# Patient Record
Sex: MALE | Race: WHITE | Employment: OTHER | URBAN - METROPOLITAN AREA
[De-identification: names, ages, dates, MRNs, and addresses within clinical notes are randomized per-mention and may not be internally consistent; named-entity substitution may affect disease eponyms.]

---

## 2024-06-11 PROBLEM — N40.0 BENIGN PROSTATIC HYPERPLASIA WITHOUT LOWER URINARY TRACT SYMPTOMS: Status: ACTIVE | Noted: 2019-05-07

## 2024-06-11 PROBLEM — R10.32 LLQ PAIN: Status: ACTIVE | Noted: 2021-09-30

## 2024-06-11 PROBLEM — N52.9 ERECTILE DYSFUNCTION: Status: ACTIVE | Noted: 2019-05-07

## 2024-06-11 PROBLEM — R19.5 NARROWING OF STOOLS: Status: ACTIVE | Noted: 2018-08-06

## 2024-06-11 PROBLEM — N20.0 CALCULUS OF KIDNEY: Status: ACTIVE | Noted: 2024-03-06

## 2024-06-11 PROBLEM — H90.3 SENSORINEURAL HEARING LOSS, BILATERAL: Status: ACTIVE | Noted: 2022-02-14

## 2024-06-11 PROBLEM — K62.5 RECTAL BLEEDING: Status: ACTIVE | Noted: 2018-08-06

## 2024-06-11 PROBLEM — N28.1 RENAL CYST, ACQUIRED, LEFT: Status: ACTIVE | Noted: 2019-05-07

## 2024-06-11 PROBLEM — E87.1 HYPONATREMIA: Status: ACTIVE | Noted: 2023-10-19

## 2024-06-11 PROBLEM — R31.9 HEMATURIA: Status: ACTIVE | Noted: 2019-05-07

## 2024-06-11 PROBLEM — R19.4 CHANGE IN BOWEL HABITS: Status: ACTIVE | Noted: 2018-08-06

## 2024-06-12 ENCOUNTER — OFFICE VISIT (OUTPATIENT)
Dept: CARDIOLOGY | Facility: HOSPITAL | Age: 79
End: 2024-06-12
Payer: MEDICARE

## 2024-06-12 VITALS
SYSTOLIC BLOOD PRESSURE: 162 MMHG | BODY MASS INDEX: 28.11 KG/M2 | WEIGHT: 219 LBS | HEIGHT: 74 IN | DIASTOLIC BLOOD PRESSURE: 80 MMHG | HEART RATE: 68 BPM

## 2024-06-12 DIAGNOSIS — R01.1 HEART MURMUR: ICD-10-CM

## 2024-06-12 DIAGNOSIS — I10 HYPERTENSION, UNSPECIFIED TYPE: Primary | ICD-10-CM

## 2024-06-12 DIAGNOSIS — E78.5 HYPERLIPIDEMIA, UNSPECIFIED HYPERLIPIDEMIA TYPE: ICD-10-CM

## 2024-06-12 LAB
ATRIAL RATE: 68 BPM
P AXIS: 41 DEGREES
P OFFSET: 166 MS
P ONSET: 99 MS
PR INTERVAL: 216 MS
Q ONSET: 207 MS
QRS COUNT: 11 BEATS
QRS DURATION: 112 MS
QT INTERVAL: 422 MS
QTC CALCULATION(BAZETT): 448 MS
QTC FREDERICIA: 440 MS
R AXIS: -33 DEGREES
T AXIS: 59 DEGREES
T OFFSET: 418 MS
VENTRICULAR RATE: 68 BPM

## 2024-06-12 PROCEDURE — 99214 OFFICE O/P EST MOD 30 MIN: CPT | Performed by: NURSE PRACTITIONER

## 2024-06-12 PROCEDURE — 3077F SYST BP >= 140 MM HG: CPT | Performed by: NURSE PRACTITIONER

## 2024-06-12 PROCEDURE — 3078F DIAST BP <80 MM HG: CPT | Performed by: NURSE PRACTITIONER

## 2024-06-12 PROCEDURE — 1159F MED LIST DOCD IN RCRD: CPT | Performed by: NURSE PRACTITIONER

## 2024-06-12 PROCEDURE — 93005 ELECTROCARDIOGRAM TRACING: CPT | Performed by: NURSE PRACTITIONER

## 2024-06-12 PROCEDURE — G2211 COMPLEX E/M VISIT ADD ON: HCPCS | Performed by: NURSE PRACTITIONER

## 2024-06-12 PROCEDURE — 1160F RVW MEDS BY RX/DR IN RCRD: CPT | Performed by: NURSE PRACTITIONER

## 2024-06-12 PROCEDURE — 99204 OFFICE O/P NEW MOD 45 MIN: CPT | Performed by: NURSE PRACTITIONER

## 2024-06-12 RX ORDER — GEMFIBROZIL 600 MG/1
600 TABLET, FILM COATED ORAL DAILY
COMMUNITY
Start: 2023-12-14

## 2024-06-12 RX ORDER — AMLODIPINE BESYLATE 10 MG/1
10 TABLET ORAL
COMMUNITY
Start: 2023-10-21 | End: 2024-10-29

## 2024-06-12 RX ORDER — SPIRONOLACTONE 25 MG/1
25 TABLET ORAL DAILY
Qty: 90 TABLET | Refills: 3 | Status: SHIPPED | OUTPATIENT
Start: 2024-06-12 | End: 2025-06-12

## 2024-06-12 RX ORDER — VALSARTAN 160 MG/1
160 TABLET ORAL
COMMUNITY
Start: 2023-10-20

## 2024-06-12 RX ORDER — EPINEPHRINE 0.22MG
100 AEROSOL WITH ADAPTER (ML) INHALATION
COMMUNITY

## 2024-06-12 RX ORDER — HYDRALAZINE HYDROCHLORIDE 10 MG/1
10 TABLET, FILM COATED ORAL 3 TIMES DAILY
COMMUNITY
Start: 2023-12-13 | End: 2024-06-12 | Stop reason: ALTCHOICE

## 2024-06-12 RX ORDER — EZETIMIBE 10 MG/1
10 TABLET ORAL
COMMUNITY
Start: 2023-11-01

## 2024-06-12 RX ORDER — LANOLIN ALCOHOL/MO/W.PET/CERES
1000 CREAM (GRAM) TOPICAL DAILY
COMMUNITY

## 2024-06-12 RX ORDER — HYDROCHLOROTHIAZIDE 12.5 MG/1
12.5 TABLET ORAL
COMMUNITY
Start: 2023-11-01 | End: 2024-06-12 | Stop reason: ALTCHOICE

## 2024-06-12 RX ORDER — OMEGA-3/DHA/EPA/FISH OIL 100-400 MG
1000 CAPSULE ORAL 2 TIMES DAILY
COMMUNITY

## 2024-06-12 ASSESSMENT — ENCOUNTER SYMPTOMS
LOSS OF SENSATION IN FEET: 1
OCCASIONAL FEELINGS OF UNSTEADINESS: 0
DEPRESSION: 0

## 2024-06-12 ASSESSMENT — PATIENT HEALTH QUESTIONNAIRE - PHQ9
1. LITTLE INTEREST OR PLEASURE IN DOING THINGS: NOT AT ALL
2. FEELING DOWN, DEPRESSED OR HOPELESS: NOT AT ALL
SUM OF ALL RESPONSES TO PHQ9 QUESTIONS 1 AND 2: 0

## 2024-06-12 NOTE — PATIENT INSTRUCTIONS
Stop Hydralazine  Start Spironolactone 25 mg once a day  Continue all other cardiovascular medication  Check blood work in one week BMP and fasting lipid panel  Follow up in 3 months  Continue physical activity   CT calcium score- [phone] call to schedule

## 2024-06-12 NOTE — PROGRESS NOTES
Primary Care Physician: No primary care provider on file.  Date of Visit: 06/12/2024  3:00 PM EDT  Location of visit: [facility]     Chief Complaint:   Chief Complaint   Patient presents with    New Patient Visit        HPI / Summary:   Sandro Saxena is a 79 y.o. male presents for followup. Seen in collaboration with Dr. Patel. Patient was seen today to establish cardiovascular care. He has significant past medical history of Hypertension, Hyperlipidemia. He has been having issues with elevated blood pressure readings since he had Covid and was treated with Paxlovid, Remdesivir in March. He was hospitalized for 2 days while he had covid and also notes having hypokalemia and hyponatremia. He had been taking Hydrochlorothiazide 25 mg once a day. He has been walking 2 miles daily without chest pain or dyspnea. He has rare lightheadedness upon standing after bending over without near syncope or syncope. He has noticed ankle swelling. Denies chest pain, dyspnea, orthopnea, pnd, syncope, palpitations, or bleeding issues.     He has been monitoring his blood pressure at home. He brought a log of blood pressures to our office. His blood pressures are variable at home. Blood pressures range from 1 teens to 140s over 70s to 80s.     He had an echocardiogram and stress test done at the heart clinic 45 years ago.     He has allergy to Penicillin- reaction unknown.     He has significant past medical history of Hypertension, Hyperlipdiemia, Renal cyst, BPH, and Erectile dysfunction. Denies personal history of diabetes, cerebrovascular disease, venous thromboembolism, prior MI, or coronary disease.     He had a cholecystectomy 35 years ago.     He was a former smoker quit 45 years ago 1 pack per day for 15-20 years. Denies alcohol use. Denies illciit drug use. Retired from water treatment operator.     Denies significant family history of sudden cardiac death, cerebrovascular disease, venous thromboembolism.           "    Past Medical History:  History reviewed. No pertinent past medical history.     Past Surgical History:  History reviewed. No pertinent surgical history.       Social History:   reports that he quit smoking about 48 years ago. His smoking use included cigarettes. He has never been exposed to tobacco smoke. He has never used smokeless tobacco. He reports that he does not currently use alcohol. He reports that he does not use drugs.     Family History:  family history is not on file.      Allergies:  Allergies   Allergen Reactions    Penicillins Unknown       Outpatient Medications:  Current Outpatient Medications   Medication Instructions    amLODIPine (NORVASC) 10 mg, oral, Daily RT    coenzyme Q-10 100 mg, oral    coffee xt/phosphatidyl serine (NEURIVA ORIGINAL ORAL) oral, Daily    cyanocobalamin (VITAMIN B-12) 1,000 mcg, oral, Daily    ezetimibe (ZETIA) 10 mg, oral, Daily RT    FENUGREEK SEED EXTRACT ORAL oral, 2 times daily    gemfibrozil (LOPID) 600 mg, oral, Daily    hydrALAZINE (APRESOLINE) 10 mg, oral, 3 times daily    hydroCHLOROthiazide (MICROZIDE) 12.5 mg, oral, Daily RT    mv-min/FA/vit K/lutein/zeaxant (PRESERVISION AREDS 2 PLUS MV ORAL) 1 tablet, oral, 2 times daily    omega 3-dha-epa-fish oil 100-400-1,000 mg capsule 1,000 mg, oral, 2 times daily    saw/vit E/sod ash/lyc/beta/pyg (PROSTATE HEALTH ORAL) oral, 2 times daily, SUPER BETA PROSTATE    valsartan (DIOVAN) 160 mg, oral, TAKE 1 TABLET IN THE AM AND 1/2 TABLET IN PM       Physical Exam:  Vitals:    06/12/24 1441 06/12/24 1442   BP: (!) 171/97 (!) 170/95   BP Location: Left arm Right arm   Patient Position: Sitting Sitting   BP Cuff Size: Adult Adult   Pulse: 68    Weight: 99.3 kg (219 lb)    Height: 1.867 m (6' 1.5\")    Blood pressure Left upper arm: 166/60 and right upper arm: 162/80  Wt Readings from Last 5 Encounters:   06/12/24 99.3 kg (219 lb)     Body mass index is 28.5 kg/m².   GENERAL: alert, cooperative, pleasant, in no acute " distress  SKIN: warm and dry  NECK: Normal JVD, negative HJR  CARDIAC: Regular rate and rhythm with 2/6 early peaking systolic murmur, no rubs or gallops  CHEST: Normal respiratory efforts, lungs clear to auscultation bilaterally.  ABDOMEN: soft, nontender, nondistended  EXTREMITIES: no edema, +2 palpable RP and DP pulses bilaterally       Last Labs:      Lab Results   Component Value Date    HGBA1C 5.3 08/07/2023   BASIC METABOLIC PANEL  Order: [id number]  Component  Ref Range & Units 4 mo ago   Glucose  70 - 100 MG/DL 77   Comment:  NORMAL <100 mg/dL  PREDIABETES 101-126 mg/dL  DIABETES 126 mg/dL or higher   BUN  7 - 20 MG/DL 16   CREATININE SERUM  0.7 - 1.2 MG/DL 1.00   SODIUM  137 - 145 MMOL/L 143   POTASSIUM  3.5 - 5.1 MMOL/L 4.4   CHLORIDE  98 - 107 MMOL/L 105   Comment: Please note: Triglyceride levels of 600mg/dL or higher may positively bias chloride results by approximately 2.1 mmol   CARBON DIOXIDE (CO2)  22 - 30 MMOL/L 28   ANION GAP  8 - 16 MMOL/L 10   CALCIUM  8.4 - 10.2 MG/DL 9.6   ESTIMATED GFR, NON AFRICAN AMER  ml/min/1.73sq.m 77   ESTIMATED GFR, AFRICAN AMERICAN  ml/min/1.73sq.m 93   GFR COMMENT Average GFR for 70+ years old = 75.   Comment: Chronic Kidney disease, GFR = <60.  Kidney failure, GFR = <15.  The GFR estimate is not adjusted for extreme body surface area or acute process, nor has it been validated for pregnant women or ethnic groups other than  and .  Testing performed at [place], [place] [address]   Resulting Agency [address]     Specimen Collected: 02/01/24 13:37    Performed by: [address] Last Resulted: 02/01/24 16:10   Received From: [facility]  Result Received: 05/02/24 15:31      Contains abnormal data CBC, EDIF, PLATELET  Order: [id number]  Component  Ref Range & Units 7 mo ago   WBC (WHITE BLOOD COUNT)  3.6 -  11.0 10*3/uL 7.1   RBC  4.0 - 6.1 10*6/uL 4.36   HEMOGLOBIN (HGB)  14.0 - 18.0 G/DL 13.6 Low    HEMATOCRIT (HCT)  42.0 - 52.0 % 40.3 Low    MEAN CELL VOLUME  80.0 - 100.0 FL 92.5   Mean Cell HGB  26.0 - 35.0 PG 31.1   MEAN CELL HGB CONCENTRATION  27.0 - 37.0 G/DL 33.6   RBC DISTRIBUTION  11.5 - 14.5 % 13.6   PLATELET COUNT  130 - 400 10*3/uL 233   MEAN PLATELET VOLUME  7.4 - 11.0 FL 7.1 Low    DIFFERENTIAL TYPE  % AUTO DIFF   NEUTROPHILS  37.0 - 75.0 % 79.7 High    LYMPHOCYTE  20.0 - 55.0 % 11.5 Low    MONOCYTE %  0.0 - 10.0 % 6.6   EOSINOPHIL %  0.0 - 11.0 % 1.8   BASOPHIL %  0.0 - 2.0 % 0.4   Absolute Neutrophil Count  1.4 - 6.5 10*3/uL 5.7   LYMPHOCYTES, ABSOLUTE  1.2 - 3.4 10*3/uL 0.8 Low    MONOCYTES, ABSOLUTE  0.0 - 0.7 10*3/uL 0.5   ABSOLUTE EOSINOPHIL COUNT  0.0 - 0.7 10*3/uL 0.1   ABSOLUTE BASOPHIL COUNT  0.0 - 0.2 10*3/uL 0.0   Resulting Agency [address]     Specimen Collected: 10/24/23 11:54    Performed by: [address] Last Resulted: 10/24/23 13:05   Received From: [facility]  Result Received: 05/02/24 15:31      NT Pro BNP  Order: [id number]  Component  Ref Range & Units 7 mo ago   NT-Pro BNP  0 - 300 pg/mL 186   Resulting Agency  [facility]   Narrative  Performed by  LAB  Pride Study Cut-offs  Rule In:  < /= 50 Years                >450 pg/mL  51 Years - 75 Years       >900 pg/mL  76 Years - 99 Years         >1800 pg/mL  Rule Out:  All patients                 <300 pg/mL    Specimen Collected: 10/19/23 07:44    Performed by:  [facility] Last Resulted: 10/19/23 08:32   Received From: [facility]  Result Received: 05/02/24 15:31      TSH with Reflex Free T4  Order: [id number]  Component  Ref Range & Units 7 mo ago   TSH  0.27 - 4.20 mcIU/mL 0.39   Resulting Agency  [facility]     Specimen Collected: 10/19/23 07:44    Performed by:  LAB Last Resulted: 10/19/23 08:32   Received From: [facility]   Result Received: 05/02/24 15:31      LIPID PANEL W CALCULATED LDL  Order: [id number]  Component  Ref Range & Units 10 mo ago   CHOLESTEROL  120 - 200 MG/   TRIGLYCERIDE  0 - 150 MG/   HDL CHOLESTEROL  26 - 63 MG/DL 37   LDL CHOLESTEROL, CALCULATED  MG/   VLDL Cholesterol, Calculated  MG/DL 22   TCHOL/HDL RATIO, MANUAL ENTER  RATIO 4.51   Comment: RISK          TOTAL/HDL RATIO                  MEN     WOMEN  1/2 AVERAGE    3.43      3.27  AVERAGE        4.97      4.44  2X AVERAGE     9.55      7.05  3X AVERAGE    23.99     11.04   Resulting Agency [address]     Specimen Collected: 08/07/23 10:48    Performed by: [address] Last Resulted: 08/07/23 13:13   Received From: [facility]  Result Received: 05/02/24 15:31      HEMOGLOBIN A1C  Order: [id number]   Status: Final result           Component  Ref Range & Units 10 mo ago  (8/7/23) 2 yr ago  (6/13/22) 2 yr ago  (12/6/21) 3 yr ago  (6/11/21)   Hemoglobin A1C  0 - 6 % 5.3 5.5 R, CM 5.6 CM 5.7 CM   Comment:  NORMAL <5.7%  PREDIABETES 5.7-6.4%  DIABETES 6.5% OR HIGHER   Estimated Average Glucose  mg/dL 105 111 114  CM   Resulting [address]              Specimen Collected: 08/07/23 10:48    Performed By: [address] Last Resulted: 08/07/23 21:56   Received From: [facility]  Result Received: 05/02/24 15:31               Last Cardiology Tests:  ECG:  Obtained and reviewed EKG- normal sinus rhythm with first degree AV block, left axis deviation, minimal voltage criteria for LVH, possible prior anterior infarct     Echo:  Echo  Results:  No results found for this or any previous visit from the past 365 days.         Cath:      Stress Test:  Stress Results:  No results found for this or any previous visit from the past 365 days.         Cardiac Imaging:      Assessment/Plan   Problem List Items Addressed This Visit    None  Visit Diagnoses       Hypertension, unspecified type    -  Primary    Relevant Medications    spironolactone (Aldactone) 25 mg tablet    Other Relevant Orders    ECG 12 lead (Clinic Performed) (Completed)    Basic metabolic panel (Completed)    CT cardiac scoring wo IV contrast    Transthoracic Echo (TTE) Complete    Hyperlipidemia, unspecified hyperlipidemia type        Relevant Orders    CT cardiac scoring wo IV contrast    Heart murmur        Relevant Orders    Transthoracic Echo (TTE) Complete          In summary Mr. Sandro Saxena is a pleasant 79 year old white female with significant past medical history of Hypertension, Hyperlipidemia, Prior tobacco use. He was seen today to establish cardiovascular care. He has been concerned as blood pressures at home have been elevated since being treated for covid in March. His blood pressure today is elevated. He had hyponatremia and hypokalemia taking Hydrochlorothiazide 25 mg once a day previously. I did stop hydrochlorothiazide and hydralazine and start Spironolactone 25 mg once a day. I have ordered blood work to be done in one week as above. Given his history of hyperlipidemia I did order a CT calcium score to further risk stratify. I also ordered an echocardiogram for surveillance of a murmur. He will continue to monitor blood pressure at home and will call me blood pressures remain elevated. I have encouraged him to continue physical activity. He will continue current cardiovascular medications. He will follow up in 3 months.       Orders:  Orders Placed This Encounter   Procedures    ECG 12 lead (Clinic Performed)      Followup Appts:  No future appointments.         ____________________________________________________________  CHE Power-CNP  [facility]

## 2024-06-18 ENCOUNTER — LAB (OUTPATIENT)
Dept: LAB | Facility: LAB | Age: 79
End: 2024-06-18
Payer: MEDICARE

## 2024-06-18 DIAGNOSIS — I10 HYPERTENSION, UNSPECIFIED TYPE: ICD-10-CM

## 2024-06-18 LAB
ANION GAP SERPL CALC-SCNC: 12 MMOL/L (ref 10–20)
BUN SERPL-MCNC: 16 MG/DL (ref 6–23)
CALCIUM SERPL-MCNC: 9.6 MG/DL (ref 8.6–10.3)
CHLORIDE SERPL-SCNC: 103 MMOL/L (ref 98–107)
CO2 SERPL-SCNC: 27 MMOL/L (ref 21–32)
CREAT SERPL-MCNC: 1.17 MG/DL (ref 0.5–1.3)
EGFRCR SERPLBLD CKD-EPI 2021: 64 ML/MIN/1.73M*2
GLUCOSE SERPL-MCNC: 108 MG/DL (ref 74–99)
POTASSIUM SERPL-SCNC: 4.2 MMOL/L (ref 3.5–5.3)
SODIUM SERPL-SCNC: 138 MMOL/L (ref 136–145)

## 2024-06-18 PROCEDURE — 36415 COLL VENOUS BLD VENIPUNCTURE: CPT

## 2024-06-18 PROCEDURE — 80048 BASIC METABOLIC PNL TOTAL CA: CPT

## 2024-06-27 ENCOUNTER — HOSPITAL ENCOUNTER (OUTPATIENT)
Dept: RADIOLOGY | Facility: HOSPITAL | Age: 79
Discharge: HOME | End: 2024-06-27
Payer: MEDICARE

## 2024-06-27 DIAGNOSIS — E78.5 HYPERLIPIDEMIA, UNSPECIFIED HYPERLIPIDEMIA TYPE: ICD-10-CM

## 2024-06-27 DIAGNOSIS — I10 HYPERTENSION, UNSPECIFIED TYPE: ICD-10-CM

## 2024-06-27 PROCEDURE — 75571 CT HRT W/O DYE W/CA TEST: CPT

## 2024-07-09 DIAGNOSIS — I25.10 CORONARY ARTERY DISEASE INVOLVING NATIVE CORONARY ARTERY OF NATIVE HEART WITHOUT ANGINA PECTORIS: ICD-10-CM

## 2024-07-09 DIAGNOSIS — R93.1 ELEVATED CORONARY ARTERY CALCIUM SCORE: Primary | ICD-10-CM

## 2024-07-09 RX ORDER — NAPROXEN SODIUM 220 MG/1
81 TABLET, FILM COATED ORAL DAILY
Start: 2024-07-09 | End: 2025-07-09

## 2024-07-09 RX ORDER — ATORVASTATIN CALCIUM 80 MG/1
80 TABLET, FILM COATED ORAL DAILY
Qty: 90 TABLET | Refills: 3 | Status: SHIPPED | OUTPATIENT
Start: 2024-07-09 | End: 2025-07-09

## 2024-07-18 ENCOUNTER — LAB (OUTPATIENT)
Dept: LAB | Facility: LAB | Age: 79
End: 2024-07-18
Payer: MEDICARE

## 2024-07-18 DIAGNOSIS — I10 HYPERTENSION, UNSPECIFIED TYPE: ICD-10-CM

## 2024-07-18 LAB
ANION GAP SERPL CALC-SCNC: 12 MMOL/L (ref 10–20)
BUN SERPL-MCNC: 18 MG/DL (ref 6–23)
CALCIUM SERPL-MCNC: 9.5 MG/DL (ref 8.6–10.3)
CHLORIDE SERPL-SCNC: 101 MMOL/L (ref 98–107)
CO2 SERPL-SCNC: 29 MMOL/L (ref 21–32)
CREAT SERPL-MCNC: 1.13 MG/DL (ref 0.5–1.3)
EGFRCR SERPLBLD CKD-EPI 2021: 67 ML/MIN/1.73M*2
GLUCOSE SERPL-MCNC: 121 MG/DL (ref 74–99)
POTASSIUM SERPL-SCNC: 4.6 MMOL/L (ref 3.5–5.3)
SODIUM SERPL-SCNC: 137 MMOL/L (ref 136–145)

## 2024-07-18 PROCEDURE — 36415 COLL VENOUS BLD VENIPUNCTURE: CPT

## 2024-07-18 PROCEDURE — 80048 BASIC METABOLIC PNL TOTAL CA: CPT

## 2024-07-30 ENCOUNTER — HOSPITAL ENCOUNTER (OUTPATIENT)
Dept: RADIOLOGY | Facility: HOSPITAL | Age: 79
Discharge: HOME | End: 2024-07-30
Payer: MEDICARE

## 2024-07-30 ENCOUNTER — HOSPITAL ENCOUNTER (OUTPATIENT)
Dept: CARDIOLOGY | Facility: HOSPITAL | Age: 79
Discharge: HOME | End: 2024-07-30
Payer: MEDICARE

## 2024-07-30 DIAGNOSIS — R93.1 ELEVATED CORONARY ARTERY CALCIUM SCORE: ICD-10-CM

## 2024-07-30 DIAGNOSIS — I25.10 CORONARY ARTERY DISEASE INVOLVING NATIVE CORONARY ARTERY OF NATIVE HEART WITHOUT ANGINA PECTORIS: ICD-10-CM

## 2024-07-30 PROCEDURE — A9502 TC99M TETROFOSMIN: HCPCS | Performed by: NURSE PRACTITIONER

## 2024-07-30 PROCEDURE — 3430000001 HC RX 343 DIAGNOSTIC RADIOPHARMACEUTICALS: Performed by: NURSE PRACTITIONER

## 2024-07-30 PROCEDURE — 93018 CV STRESS TEST I&R ONLY: CPT | Performed by: STUDENT IN AN ORGANIZED HEALTH CARE EDUCATION/TRAINING PROGRAM

## 2024-07-30 PROCEDURE — 93016 CV STRESS TEST SUPVJ ONLY: CPT | Performed by: STUDENT IN AN ORGANIZED HEALTH CARE EDUCATION/TRAINING PROGRAM

## 2024-07-30 PROCEDURE — 78452 HT MUSCLE IMAGE SPECT MULT: CPT

## 2024-07-30 PROCEDURE — 93017 CV STRESS TEST TRACING ONLY: CPT

## 2024-07-30 PROCEDURE — 78452 HT MUSCLE IMAGE SPECT MULT: CPT | Performed by: STUDENT IN AN ORGANIZED HEALTH CARE EDUCATION/TRAINING PROGRAM

## 2024-09-05 ENCOUNTER — TELEPHONE (OUTPATIENT)
Dept: CARDIOLOGY | Facility: HOSPITAL | Age: 79
End: 2024-09-05

## 2024-09-05 ENCOUNTER — LAB (OUTPATIENT)
Dept: LAB | Facility: LAB | Age: 79
End: 2024-09-05
Payer: MEDICARE

## 2024-09-05 DIAGNOSIS — R93.1 ELEVATED CORONARY ARTERY CALCIUM SCORE: ICD-10-CM

## 2024-09-05 DIAGNOSIS — I25.10 CORONARY ARTERY DISEASE INVOLVING NATIVE CORONARY ARTERY OF NATIVE HEART WITHOUT ANGINA PECTORIS: ICD-10-CM

## 2024-09-05 LAB
CHOLEST SERPL-MCNC: 92 MG/DL (ref 0–199)
CHOLESTEROL/HDL RATIO: 2.5
HDLC SERPL-MCNC: 37 MG/DL
LDLC SERPL CALC-MCNC: 31 MG/DL
NON HDL CHOLESTEROL: 55 MG/DL (ref 0–149)
TRIGL SERPL-MCNC: 121 MG/DL (ref 0–149)
VLDL: 24 MG/DL (ref 0–40)

## 2024-09-05 PROCEDURE — 36415 COLL VENOUS BLD VENIPUNCTURE: CPT

## 2024-09-05 PROCEDURE — 80061 LIPID PANEL: CPT

## 2024-09-05 NOTE — TELEPHONE ENCOUNTER
Patient called saying since starting the atorvastatin he has been having awful leg cramps. It has gotten so bad the last couple days he did not take it today.

## 2024-09-18 ENCOUNTER — OFFICE VISIT (OUTPATIENT)
Dept: CARDIOLOGY | Facility: HOSPITAL | Age: 79
End: 2024-09-18
Payer: MEDICARE

## 2024-09-18 ENCOUNTER — HOSPITAL ENCOUNTER (OUTPATIENT)
Dept: CARDIOLOGY | Facility: HOSPITAL | Age: 79
Discharge: HOME | End: 2024-09-18
Payer: MEDICARE

## 2024-09-18 VITALS
HEIGHT: 74 IN | BODY MASS INDEX: 27.98 KG/M2 | DIASTOLIC BLOOD PRESSURE: 85 MMHG | HEART RATE: 68 BPM | OXYGEN SATURATION: 97 % | SYSTOLIC BLOOD PRESSURE: 169 MMHG | WEIGHT: 218 LBS

## 2024-09-18 DIAGNOSIS — I10 HYPERTENSION, UNSPECIFIED TYPE: ICD-10-CM

## 2024-09-18 DIAGNOSIS — I25.10 CORONARY ARTERY DISEASE INVOLVING NATIVE CORONARY ARTERY OF NATIVE HEART WITHOUT ANGINA PECTORIS: ICD-10-CM

## 2024-09-18 DIAGNOSIS — R01.1 HEART MURMUR: ICD-10-CM

## 2024-09-18 DIAGNOSIS — I10 HYPERTENSION, UNSPECIFIED TYPE: Primary | ICD-10-CM

## 2024-09-18 DIAGNOSIS — E78.5 HYPERLIPIDEMIA, UNSPECIFIED HYPERLIPIDEMIA TYPE: ICD-10-CM

## 2024-09-18 PROCEDURE — 1159F MED LIST DOCD IN RCRD: CPT | Performed by: NURSE PRACTITIONER

## 2024-09-18 PROCEDURE — G2211 COMPLEX E/M VISIT ADD ON: HCPCS | Performed by: NURSE PRACTITIONER

## 2024-09-18 PROCEDURE — 3079F DIAST BP 80-89 MM HG: CPT | Performed by: NURSE PRACTITIONER

## 2024-09-18 PROCEDURE — 3077F SYST BP >= 140 MM HG: CPT | Performed by: NURSE PRACTITIONER

## 2024-09-18 PROCEDURE — 93306 TTE W/DOPPLER COMPLETE: CPT | Performed by: INTERNAL MEDICINE

## 2024-09-18 PROCEDURE — 93306 TTE W/DOPPLER COMPLETE: CPT

## 2024-09-18 PROCEDURE — 99214 OFFICE O/P EST MOD 30 MIN: CPT | Performed by: NURSE PRACTITIONER

## 2024-09-18 PROCEDURE — 99214 OFFICE O/P EST MOD 30 MIN: CPT | Mod: 25 | Performed by: NURSE PRACTITIONER

## 2024-09-18 RX ORDER — ROSUVASTATIN CALCIUM 20 MG/1
20 TABLET, COATED ORAL DAILY
Qty: 90 TABLET | Refills: 3 | Status: SHIPPED | OUTPATIENT
Start: 2024-09-18 | End: 2025-09-18

## 2024-09-18 RX ORDER — AMLODIPINE BESYLATE 5 MG/1
5 TABLET ORAL
Qty: 90 TABLET | Refills: 3 | Status: SHIPPED | OUTPATIENT
Start: 2024-09-18 | End: 2025-09-18

## 2024-09-18 RX ORDER — VALSARTAN 160 MG/1
160 TABLET ORAL 2 TIMES DAILY
Qty: 180 TABLET | Refills: 3 | Status: SHIPPED | OUTPATIENT
Start: 2024-09-18 | End: 2025-09-18

## 2024-09-18 NOTE — PATIENT INSTRUCTIONS
Decrease Amlodipine to 5 mg once a day  Stay off Atorvastatin  Start Rosuvastatin 20 mg once a day  Increase Valsartan to 160 mg twice a day  Continue all other meds  Check blood work one week BMP  Follow up in March with Dr. Patel  Continue to monitor blood pressure.   If you get muscle aches/cramping and/or low blood pressures message me or call me

## 2024-09-18 NOTE — PROGRESS NOTES
Primary Care Physician: Nicolás Salter II, MD  Date of Visit: 09/18/2024  2:00 PM EDT  Location of visit: [facility]     Chief Complaint:   Chief Complaint   Patient presents with    Follow-up        HPI / Summary:   Sandro Saxena is a 79 y.o. male presents for followup. Seen in collaboration with Dr. Patel. He has been walking 2 miles every other day without chest pain or dyspnea. He stopped taking Atorvastatin 80 mg daily on 9/11 due to myalgias. His myalgias had since resolved. He has noted increased lower extremity edema and sensation of feeling cold all the time since taking Amlodipine 10 mg daily. Denies chest pain, dyspnea, orthopnea, pnd, lightheadedness, syncope, palpitations, or bleeding issues.     He has been monitoring his blood pressure at home. His blood pressures have been 1 teens to 130s over 70s.     No past medical history on file.     Past Surgical History:  No past surgical history on file.       Social History:   reports that he quit smoking about 48 years ago. His smoking use included cigarettes. He has never been exposed to tobacco smoke. He has never used smokeless tobacco. He reports that he does not currently use alcohol. He reports that he does not use drugs.     Family History:  family history is not on file.      Allergies:  Allergies   Allergen Reactions    Penicillins Unknown       Outpatient Medications:  Current Outpatient Medications   Medication Instructions    amLODIPine (NORVASC) 10 mg, oral, Daily RT    aspirin 81 mg, oral, Daily    atorvastatin (LIPITOR) 80 mg, oral, Daily    coenzyme Q-10 100 mg, oral    coffee xt/phosphatidyl serine (NEURIVA ORIGINAL ORAL) oral, Daily    cyanocobalamin (VITAMIN B-12) 1,000 mcg, oral, Daily    ezetimibe (ZETIA) 10 mg, oral, Daily RT    FENUGREEK SEED EXTRACT ORAL oral, 2 times daily    mv-min/FA/vit K/lutein/zeaxant (PRESERVISION AREDS 2 PLUS MV ORAL) 1 tablet, oral, 2 times daily    omega 3-dha-epa-fish oil 100-400-1,000 mg capsule  "1,000 mg, oral, 2 times daily    saw/vit E/sod ash/lyc/beta/pyg (PROSTATE HEALTH ORAL) oral, 2 times daily, SUPER BETA PROSTATE    spironolactone (ALDACTONE) 25 mg, oral, Daily    valsartan (DIOVAN) 160 mg, oral, TAKE 1 TABLET IN THE AM AND 1/2 TABLET IN PM       Physical Exam:  Vitals:    09/18/24 1348   BP: 169/85   BP Location: Left arm   Pulse: 68   SpO2: 97%   Weight: 98.9 kg (218 lb)   Height: 1.88 m (6' 2\")   Manual BP /80. Patient's blood pressure machine 164/97  Wt Readings from Last 5 Encounters:   09/18/24 98.9 kg (218 lb)   06/12/24 99.3 kg (219 lb)     Body mass index is 27.99 kg/m².     GENERAL: alert, cooperative, pleasant, in no acute distress  SKIN: warm and dry  NECK: Normal JVD, negative HJR  CARDIAC: Regular rate and rhythm with 2/6 early peaking systolic murmur RUSB no rubs or gallops  CHEST: Normal respiratory efforts, lungs clear to auscultation bilaterally.  ABDOMEN: soft, nontender, nondistended  EXTREMITIES: +1 bilateral lower extremity edema, +2 palpable RP bilaterally       Last Labs:    Lab Results   Component Value Date    HGBA1C 5.3 08/07/2023   BASIC METABOLIC PANEL  Order: [id number]  Component  Ref Range & Units 4 mo ago   Glucose  70 - 100 MG/DL 77   Comment:  NORMAL <100 mg/dL  PREDIABETES 101-126 mg/dL  DIABETES 126 mg/dL or higher   BUN  7 - 20 MG/DL 16   CREATININE SERUM  0.7 - 1.2 MG/DL 1.00   SODIUM  137 - 145 MMOL/L 143   POTASSIUM  3.5 - 5.1 MMOL/L 4.4   CHLORIDE  98 - 107 MMOL/L 105   Comment: Please note: Triglyceride levels of 600mg/dL or higher may positively bias chloride results by approximately 2.1 mmol   CARBON DIOXIDE (CO2)  22 - 30 MMOL/L 28   ANION GAP  8 - 16 MMOL/L 10   CALCIUM  8.4 - 10.2 MG/DL 9.6   ESTIMATED GFR, NON AFRICAN AMER  ml/min/1.73sq.m 77   ESTIMATED GFR, AFRICAN AMERICAN  ml/min/1.73sq.m 93   GFR COMMENT Average GFR for 70+ years old = 75.   Comment: Chronic Kidney disease, GFR = <60.  Kidney failure, GFR = <15.  The GFR estimate is not " adjusted for extreme body surface area or acute process, nor has it been validated for pregnant women or ethnic groups other than  and .  Testing performed at [place], [place] [address]   Resulting Agency [address]     Specimen Collected: 02/01/24 13:37    Performed by: [address] Last Resulted: 02/01/24 16:10   Received From: [facility]  Result Received: 05/02/24 15:31      Contains abnormal data CBC, EDIF, PLATELET  Order: [id number]  Component  Ref Range & Units 7 mo ago   WBC (WHITE BLOOD COUNT)  3.6 - 11.0 10*3/uL 7.1   RBC  4.0 - 6.1 10*6/uL 4.36   HEMOGLOBIN (HGB)  14.0 - 18.0 G/DL 13.6 Low    HEMATOCRIT (HCT)  42.0 - 52.0 % 40.3 Low    MEAN CELL VOLUME  80.0 - 100.0 FL 92.5   Mean Cell HGB  26.0 - 35.0 PG 31.1   MEAN CELL HGB CONCENTRATION  27.0 - 37.0 G/DL 33.6   RBC DISTRIBUTION  11.5 - 14.5 % 13.6   PLATELET COUNT  130 - 400 10*3/uL 233   MEAN PLATELET VOLUME  7.4 - 11.0 FL 7.1 Low    DIFFERENTIAL TYPE  % AUTO DIFF   NEUTROPHILS  37.0 - 75.0 % 79.7 High    LYMPHOCYTE  20.0 - 55.0 % 11.5 Low    MONOCYTE %  0.0 - 10.0 % 6.6   EOSINOPHIL %  0.0 - 11.0 % 1.8   BASOPHIL %  0.0 - 2.0 % 0.4   Absolute Neutrophil Count  1.4 - 6.5 10*3/uL 5.7   LYMPHOCYTES, ABSOLUTE  1.2 - 3.4 10*3/uL 0.8 Low    MONOCYTES, ABSOLUTE  0.0 - 0.7 10*3/uL 0.5   ABSOLUTE EOSINOPHIL COUNT  0.0 - 0.7 10*3/uL 0.1   ABSOLUTE BASOPHIL COUNT  0.0 - 0.2 10*3/uL 0.0   Resulting Agency [address]     Specimen Collected: 10/24/23 11:54    Performed by: [address] Last Resulted: 10/24/23 13:05   Received From: [facility]  Result Received: 05/02/24 15:31      NT Pro BNP  Order: [id number]  Component  Ref Range & Units 7 mo ago   NT-Pro BNP  0 - 300 pg/mL 186    Resulting Agency  LAB   Narrative  Performed by  LAB  Pride Study Cut-offs  Rule In:  < /= 50 Years                >450 pg/mL  51 Years - 75 Years       >900 pg/mL  76 Years - 99 Years         >1800 pg/mL  Rule Out:  All patients                 <300 pg/mL    Specimen Collected: 10/19/23 07:44    Performed by:  LAB Last Resulted: 10/19/23 08:32   Received From: [facility]  Result Received: 05/02/24 15:31      TSH with Reflex Free T4  Order: [id number]  Component  Ref Range & Units 7 mo ago   TSH  0.27 - 4.20 mcIU/mL 0.39   Resulting [facility]     Specimen Collected: 10/19/23 07:44    Performed by:  LAB Last Resulted: 10/19/23 08:32   Received From: [facility]  Result Received: 05/02/24 15:31      LIPID PANEL W CALCULATED LDL  Order: [id number]  Component  Ref Range & Units 10 mo ago   CHOLESTEROL  120 - 200 MG/   TRIGLYCERIDE  0 - 150 MG/   HDL CHOLESTEROL  26 - 63 MG/DL 37   LDL CHOLESTEROL, CALCULATED  MG/   VLDL Cholesterol, Calculated  MG/DL 22   TCHOL/HDL RATIO, MANUAL ENTER  RATIO 4.51   Comment: RISK          TOTAL/HDL RATIO                  MEN     WOMEN  1/2 AVERAGE    3.43      3.27  AVERAGE        4.97      4.44  2X AVERAGE     9.55      7.05  3X AVERAGE    23.99     11.04   Resulting [address]     Specimen Collected: 08/07/23 10:48    Performed by: [address] Last Resulted: 08/07/23 13:13   Received From: [facility]  Result Received: 05/02/24 15:31      HEMOGLOBIN A1C  Order: [id number]   Status: Final result           Component  Ref Range & Units 10 mo ago  (8/7/23) 2 yr ago  (6/13/22) 2 yr ago  (12/6/21) 3 yr ago  (6/11/21)   Hemoglobin A1C  0 - 6 % 5.3 5.5 R, CM 5.6 CM 5.7 CM   Comment:  NORMAL <5.7%  PREDIABETES 5.7-6.4%  DIABETES 6.5% OR HIGHER   Estimated Average Glucose  mg/dL 105 111 114  CM   Resulting [facility] - [address] [facility] - [address] [facility] - [address] [facility] - [address] [facility]              Specimen Collected: 08/07/23 10:48    Performed By: [facility] - [facility] Last Resulted: 08/07/23 21:56   Received From: [facility]  Result Received: 05/02/24 15:31               LABS:  CMP:      07/18/24  1004 06/18/24  0939    138   K 4.6 4.2    103   BUN 18 16   CREATININE 1.13 1.17     CMP -  Lab Results   Component Value Date    CALCIUM 9.5 07/18/2024       LIPID PANEL -   Lab Results   Component Value Date    CHOL 92 09/05/2024    HDL 37.0 09/05/2024    TRIG 121 09/05/2024       Lab Results   Component Value Date    HGBA1C 5.3 08/07/2023                 Last Cardiology Tests:  ECG:  Reviewed EKG from 7/30/24- normal sinus rhythm with first degree AV block, left axis deviation, minimal voltage criteria for LVH, possible prior anterior infarct     Echo:  Echo Results:  Echo pending read.          Cath:      Stress Test:  Stress Results:  Nuclear exercise stress test   7/30/24  Summary:   1. Baseline EKG showing normal sinus rhythm with no resting ST-T segment changes.   2. Patient exercised for 9 minutes and 15 seconds achieving 10.5 METS.   3. Heart rate response to exercise is normal. Blood pressure response to exercise is elevated.   4. Exercise capacity is abovel average for age.   5. With exercise, there are no ST-T segment changes suggestive of ischemia. No sustained ventricular arrhythmias are seen.   6. Exercise stress EKG is negative for ischemia.   7. Don treadmill score is 9 (low risk).   8. Adequate level of stress achieved.   9. Nuclear image results are reported separately.  7/30/24  IMPRESSION:  1. Negative myocardial perfusion study without evidence of inducible  myocardial ischemia or prior  infarction.  2. The left ventricle is normal in size.  3. Normal LV wall motion with a post-stress LV EF estimated at 59    Cardiac Imaging:    CT calcium score 6/27/24  IMPRESSION:  1. Coronary artery calcium score of 1121.01*.  2. The ascending thoracic aorta is dilated measuring 4 cm at its  maximal diameter. There are no prior studies for comparison.  Recommend repeat CT angio of the chest and or MRA of the chest within  12-24 months for surveillance.  3. Dilated main pulmonary arteries as described above. Correlate  clinically with elevated right-sided filling pressures.    CT urogram abdomen/pelvis 5/14/2019 at [facility]  There are scattered atherosclerotic   calcifications of the abdominal aorta with no aneurysmal dilatation.   Assessment/Plan   Sandro was seen today for follow-up.  Diagnoses and all orders for this visit:  Hypertension, unspecified type (Primary)  -     valsartan (Diovan) 160 mg tablet; Take 1 tablet (160 mg) by mouth 2 times a day.  -     amLODIPine (Norvasc) 5 mg tablet; Take 1 tablet (5 mg) by mouth once daily.  -     Basic metabolic panel; Future  Hyperlipidemia, unspecified hyperlipidemia type  -     Lipid panel; Future  -     rosuvastatin (Crestor) 20 mg tablet; Take 1 tablet (20 mg) by mouth once daily.  Coronary artery disease involving native coronary artery of native heart without angina pectoris  -     Lipid panel; Future  -     rosuvastatin (Crestor) 20 mg tablet; Take 1 tablet (20 mg) by mouth once daily.        In summary Mr. Sandro Saxena is a pleasant 79 year old white female with significant past medical history of  Coronary artery disease with CT calcium score of 1121, Hypertension, Hyperlipidemia, Ascending aortic aneurysm, Prior tobacco use. He is relatively asymptomatic from a cardiovascular perspective. His blood pressure is elevated today. Home blood pressure readings are normal. I did decrease his Amlodipine to 5 mg once a day due to lower extremity edema  and increased Valsartan as above. He did not desire to take beta blocker. I have ordered repeat blood work as above to be done in one week. He will continue to monitor blood pressure at home and call me if he has low blood pressure readings and/or lightheadedness/dizziness. His recent lipid panel is at goal. He was unable to tolerate Atorvastatin 80 mg daily due to myalgias. I have switch him to Rosuvastatin 20 mg once a day. He will call me or message me if he develops myalgias. I have ordered fasting lipid panel to be done in 3 months. I have encouraged him to continue physical activity. He will continue current cardiovascular medications. He will follow up in as scheduled with Dr. Patel.       Orders:  No orders of the defined types were placed in this encounter.     Followup Appts:  Future Appointments   Date Time Provider Department Center   3/19/2025  2:00 PM Mehul Patel MD [id number]           ____________________________________________________________  Jonna Cisneros, APRN-CNP  [facility]  [facility]

## 2024-09-20 LAB
AORTIC VALVE MEAN GRADIENT: 4.4 MMHG
AORTIC VALVE PEAK VELOCITY: 1.54 M/S
AV PEAK GRADIENT: 9.5 MMHG
AVA (PEAK VEL): 3.44 CM2
AVA (VTI): 3.38 CM2
EJECTION FRACTION APICAL 4 CHAMBER: 64.1
EJECTION FRACTION: 63 %
LEFT ATRIUM VOLUME AREA LENGTH INDEX BSA: 31 ML/M2
LEFT VENTRICLE INTERNAL DIMENSION DIASTOLE: 5.12 CM (ref 3.5–6)
LEFT VENTRICULAR OUTFLOW TRACT DIAMETER: 2.3 CM
MITRAL VALVE E/A RATIO: 0.63
RIGHT VENTRICLE FREE WALL PEAK S': 18 CM/S
RIGHT VENTRICLE PEAK SYSTOLIC PRESSURE: 35.5 MMHG

## 2024-09-26 ENCOUNTER — LAB (OUTPATIENT)
Dept: LAB | Facility: LAB | Age: 79
End: 2024-09-26
Payer: MEDICARE

## 2024-09-26 DIAGNOSIS — I10 HYPERTENSION, UNSPECIFIED TYPE: ICD-10-CM

## 2024-09-26 LAB
ANION GAP SERPL CALC-SCNC: 13 MMOL/L (ref 10–20)
BUN SERPL-MCNC: 17 MG/DL (ref 6–23)
CALCIUM SERPL-MCNC: 9.7 MG/DL (ref 8.6–10.3)
CHLORIDE SERPL-SCNC: 103 MMOL/L (ref 98–107)
CO2 SERPL-SCNC: 27 MMOL/L (ref 21–32)
CREAT SERPL-MCNC: 1.26 MG/DL (ref 0.5–1.3)
EGFRCR SERPLBLD CKD-EPI 2021: 58 ML/MIN/1.73M*2
GLUCOSE SERPL-MCNC: 108 MG/DL (ref 74–99)
POTASSIUM SERPL-SCNC: 4.2 MMOL/L (ref 3.5–5.3)
SODIUM SERPL-SCNC: 139 MMOL/L (ref 136–145)

## 2024-09-26 PROCEDURE — 80048 BASIC METABOLIC PNL TOTAL CA: CPT

## 2024-09-26 PROCEDURE — 36415 COLL VENOUS BLD VENIPUNCTURE: CPT

## 2024-10-02 DIAGNOSIS — I25.10 CORONARY ARTERY DISEASE INVOLVING NATIVE CORONARY ARTERY OF NATIVE HEART WITHOUT ANGINA PECTORIS: ICD-10-CM

## 2025-01-09 ENCOUNTER — LAB (OUTPATIENT)
Dept: LAB | Facility: LAB | Age: 80
End: 2025-01-09
Payer: MEDICARE

## 2025-01-09 DIAGNOSIS — I25.10 CORONARY ARTERY DISEASE INVOLVING NATIVE CORONARY ARTERY OF NATIVE HEART WITHOUT ANGINA PECTORIS: ICD-10-CM

## 2025-01-09 DIAGNOSIS — E78.5 HYPERLIPIDEMIA, UNSPECIFIED HYPERLIPIDEMIA TYPE: ICD-10-CM

## 2025-01-09 LAB
ANION GAP SERPL CALC-SCNC: 13 MMOL/L (ref 10–20)
BUN SERPL-MCNC: 16 MG/DL (ref 6–23)
CALCIUM SERPL-MCNC: 9.8 MG/DL (ref 8.6–10.3)
CHLORIDE SERPL-SCNC: 101 MMOL/L (ref 98–107)
CHOLEST SERPL-MCNC: 93 MG/DL (ref 0–199)
CHOLESTEROL/HDL RATIO: 2.3
CO2 SERPL-SCNC: 28 MMOL/L (ref 21–32)
CREAT SERPL-MCNC: 1.13 MG/DL (ref 0.5–1.3)
EGFRCR SERPLBLD CKD-EPI 2021: 66 ML/MIN/1.73M*2
GLUCOSE SERPL-MCNC: 125 MG/DL (ref 74–99)
HDLC SERPL-MCNC: 40 MG/DL
LDLC SERPL CALC-MCNC: 25 MG/DL
NON HDL CHOLESTEROL: 53 MG/DL (ref 0–149)
POTASSIUM SERPL-SCNC: 4.3 MMOL/L (ref 3.5–5.3)
SODIUM SERPL-SCNC: 138 MMOL/L (ref 136–145)
TRIGL SERPL-MCNC: 142 MG/DL (ref 0–149)
VLDL: 28 MG/DL (ref 0–40)

## 2025-01-09 PROCEDURE — 80061 LIPID PANEL: CPT

## 2025-01-09 PROCEDURE — 80048 BASIC METABOLIC PNL TOTAL CA: CPT

## 2025-01-16 DIAGNOSIS — I10 HYPERTENSION, UNSPECIFIED TYPE: Primary | ICD-10-CM

## 2025-01-16 RX ORDER — EPLERENONE 25 MG/1
25 TABLET, FILM COATED ORAL DAILY
Qty: 90 TABLET | Refills: 3 | Status: SHIPPED | OUTPATIENT
Start: 2025-01-16 | End: 2026-01-16

## 2025-01-29 LAB
ANION GAP SERPL CALCULATED.4IONS-SCNC: 9 MMOL/L (CALC) (ref 7–17)
BUN SERPL-MCNC: 12 MG/DL (ref 7–25)
BUN/CREAT SERPL: ABNORMAL (CALC) (ref 6–22)
CALCIUM SERPL-MCNC: 9.4 MG/DL (ref 8.6–10.3)
CHLORIDE SERPL-SCNC: 103 MMOL/L (ref 98–110)
CHOLEST SERPL-MCNC: 86 MG/DL
CHOLEST/HDLC SERPL: 2.3 (CALC)
CO2 SERPL-SCNC: 30 MMOL/L (ref 20–32)
CREAT SERPL-MCNC: 1.08 MG/DL (ref 0.7–1.28)
EGFRCR SERPLBLD CKD-EPI 2021: 70 ML/MIN/1.73M2
GLUCOSE SERPL-MCNC: 119 MG/DL (ref 65–99)
HDLC SERPL-MCNC: 37 MG/DL
LDLC SERPL CALC-MCNC: 26 MG/DL (CALC)
NONHDLC SERPL-MCNC: 49 MG/DL (CALC)
POTASSIUM SERPL-SCNC: 4.3 MMOL/L (ref 3.5–5.3)
SODIUM SERPL-SCNC: 142 MMOL/L (ref 135–146)
TRIGL SERPL-MCNC: 152 MG/DL

## 2025-03-19 ENCOUNTER — OFFICE VISIT (OUTPATIENT)
Dept: CARDIOLOGY | Facility: HOSPITAL | Age: 80
End: 2025-03-19
Payer: MEDICARE

## 2025-03-19 VITALS
BODY MASS INDEX: 27.72 KG/M2 | HEART RATE: 66 BPM | RESPIRATION RATE: 18 BRPM | OXYGEN SATURATION: 98 % | SYSTOLIC BLOOD PRESSURE: 160 MMHG | DIASTOLIC BLOOD PRESSURE: 78 MMHG | HEIGHT: 74 IN | WEIGHT: 216 LBS

## 2025-03-19 DIAGNOSIS — I10 HYPERTENSION, UNSPECIFIED TYPE: ICD-10-CM

## 2025-03-19 DIAGNOSIS — I77.810 ASCENDING AORTA DILATATION (CMS-HCC): ICD-10-CM

## 2025-03-19 DIAGNOSIS — I25.10 CORONARY ARTERY DISEASE INVOLVING NATIVE CORONARY ARTERY OF NATIVE HEART WITHOUT ANGINA PECTORIS: Primary | ICD-10-CM

## 2025-03-19 DIAGNOSIS — E78.5 HYPERLIPIDEMIA, UNSPECIFIED HYPERLIPIDEMIA TYPE: ICD-10-CM

## 2025-03-19 DIAGNOSIS — R29.818 SUSPECTED SLEEP APNEA: ICD-10-CM

## 2025-03-19 PROCEDURE — 1036F TOBACCO NON-USER: CPT | Performed by: INTERNAL MEDICINE

## 2025-03-19 PROCEDURE — 3077F SYST BP >= 140 MM HG: CPT | Performed by: INTERNAL MEDICINE

## 2025-03-19 PROCEDURE — 1160F RVW MEDS BY RX/DR IN RCRD: CPT | Performed by: INTERNAL MEDICINE

## 2025-03-19 PROCEDURE — 99214 OFFICE O/P EST MOD 30 MIN: CPT | Performed by: INTERNAL MEDICINE

## 2025-03-19 PROCEDURE — 1159F MED LIST DOCD IN RCRD: CPT | Performed by: INTERNAL MEDICINE

## 2025-03-19 PROCEDURE — G2211 COMPLEX E/M VISIT ADD ON: HCPCS | Performed by: INTERNAL MEDICINE

## 2025-03-19 PROCEDURE — 3078F DIAST BP <80 MM HG: CPT | Performed by: INTERNAL MEDICINE

## 2025-03-19 RX ORDER — DOXAZOSIN 2 MG/1
2 TABLET ORAL NIGHTLY
Qty: 90 TABLET | Refills: 3 | Status: SHIPPED | OUTPATIENT
Start: 2025-03-19 | End: 2026-03-19

## 2025-03-19 RX ORDER — OLMESARTAN MEDOXOMIL 40 MG/1
40 TABLET ORAL DAILY
Qty: 90 TABLET | Refills: 3 | Status: SHIPPED | OUTPATIENT
Start: 2025-03-19 | End: 2026-03-19

## 2025-03-19 NOTE — PATIENT INSTRUCTIONS
Stop eplerenone.  Stop valsartan.  Start olmesartan 40 mg daily.  Start doxazosin 2 mg daily.  Continue to monitor your blood pressure at home.  Check a CMP in 2 weeks.  Check a noncontrast CT of your chest in June.  Sleep medicine consult.  Follow-up in 6 months.

## 2025-03-19 NOTE — PROGRESS NOTES
Primary Care Physician: Nicolás Salter II, MD  Date of Visit: 03/19/2025  2:00 PM EDT  Location of visit: [facility]     Chief Complaint:   Chief Complaint   Patient presents with    Follow-up        HPI / Summary:   Sandro Saxena is a 80 y.o. male who presents for followup.  He has no cardiac complaints.  He walks up to 2 miles on a regular basis without chest pain or shortness of breath.  He does monitor his blood pressure at home and his systolic blood pressures usually in the 120s to 130s.  He is average reading for the last month was 130/74.  He notes intermittent mild lower extremity edema.  He does note breast tenderness on eplerenone.  The patient denies chest pain, shortness of breath, palpitations, lightheadedness, syncope, orthopnea, paroxysmal nocturnal dyspnea, or bleeding problems.    He was taken off of hydrochlorothiazide in October 2023 when he had hyponatremia with a sodium level of 127.  This was also after a recent COVID infection.    He was intolerant to 10 mg of amlodipine secondary to lower extremity edema.    He was intolerant to spironolactone secondary to breast tenderness.  He now has breast tenderness gait eplerenone.    His resting heart rate is usually around 60 at home.              History reviewed. No pertinent past medical history.     History reviewed. No pertinent surgical history.       Social History:   reports that he quit smoking about 49 years ago. His smoking use included cigarettes. He has never been exposed to tobacco smoke. He has never used smokeless tobacco. He reports that he does not currently use alcohol. He reports that he does not use drugs.     Family History:  family history is not on file.      Allergies:  Allergies   Allergen Reactions    Penicillins Unknown       Outpatient Medications:  Current Outpatient Medications   Medication Instructions    amLODIPine (NORVASC) 5 mg, oral, Daily RT    aspirin 81 mg, oral, Daily    coenzyme Q-10 100 mg    coffee  "xt/phosphatidyl serine (NEURIVA ORIGINAL ORAL) Daily    cyanocobalamin (VITAMIN B-12) 1,000 mcg, Daily    eplerenone (INSPRA) 25 mg, oral, Daily    ezetimibe (ZETIA) 10 mg, Daily RT    FENUGREEK SEED EXTRACT ORAL 2 times daily    mv-min/FA/vit K/lutein/zeaxant (PRESERVISION AREDS 2 PLUS MV ORAL) 1 tablet, 2 times daily    omega 3-dha-epa-fish oil 100-400-1,000 mg capsule 1,000 mg, 2 times daily    rosuvastatin (CRESTOR) 20 mg, oral, Daily    saw/vit E/sod ash/lyc/beta/pyg (PROSTATE HEALTH ORAL) 2 times daily    valsartan (DIOVAN) 160 mg, oral, 2 times daily       Physical Exam:  Vitals:    03/19/25 1404   BP: 160/78   BP Location: Left arm   Patient Position: Sitting   BP Cuff Size: Adult   Pulse: 66   Resp: 18   SpO2: 98%   Weight: 98 kg (216 lb)   Height: 1.867 m (6' 1.5\")     Wt Readings from Last 5 Encounters:   03/19/25 98 kg (216 lb)   09/18/24 98.9 kg (218 lb)   06/12/24 99.3 kg (219 lb)     Body mass index is 28.11 kg/m².   General: Well-developed well-nourished in no acute distress  HEENT: Normocephalic atraumatic  Neck: Supple, JVP is normal negative hepatojugular reflux 2+ carotid pulses without bruit  Pulmonary: Normal respiratory effort, clear to auscultation  Cardiovascular: No right ventricular heave, normal S1 and S2, 1 out of 6 early peaking systolic ejection murmur no rubs or gallops  Abdomen: Soft nontender nondistended  Extremities: Warm without edema 2+ radial pulses bilaterally 2+ posterior tibial pulses bilaterally  Neurologic: Alert and oriented x3  Psychiatric: Normal mood and affect     Last Labs:  CMP:  Recent Labs     01/28/25  0749 01/09/25  0736 09/26/24  0734    138 139   K 4.3 4.3 4.2    101 103   CO2 30 28 27   ANIONGAP 9 13 13   BUN 12 16 17   CREATININE 1.08 1.13 1.26   EGFR 70 66 58*   GLUCOSE 119* 125* 108*   No results for input(s): \"ALBUMIN\", \"ALKPHOS\", \"ALT\", \"AST\", \"BILITOT\" in the last 65423 hours.    No lab exists for component: \"CA\"  CBC:No results for " "input(s): \"WBC\", \"HGB\", \"HCT\", \"PLT\", \"MCV\" in the last 51905 hours.  COAG: No results for input(s): \"INR\", \"DDIMERVTE\" in the last 26355 hours.  HEME/ENDO:  Recent Labs     08/07/23  1048 06/13/22  1051 12/06/21  0845   HGBA1C 5.3 5.5 5.6      CARDIAC: No results for input(s): \"LDH\", \"CKMB\", \"TROPHS\", \"BNP\" in the last 56062 hours.    No lab exists for component: \"CK\", \"CKMBP\"  Recent Labs     01/28/25  0752 01/09/25  0736 09/05/24  0731   CHOL 86 93 92   LDLCALC 26 25 31   HDL 37* 40.0 37.0   TRIG 152* 142 121       Last Cardiology Tests:  ECG:  I reviewed electrocardiogram from July 30, 2024 that showed normal sinus rhythm first-degree AV block possible prior inferior infarct pattern.    Echo:  Echo Results:  Transthoracic Echo (TTE) Complete 09/18/2024    [facility], [address] [place] [address]  Tel [phone] and Fax [phone]    TRANSTHORACIC ECHOCARDIOGRAM REPORT      Patient Name:      MELODY BESSY Alvarenga Physician:    [id number] Mehul Patel MD  Study Date:        9/18/2024            Ordering Provider:    [id number] CHELSEA CHACON  MRN/PID:           [id number]             Fellow:  Accession#:        [id number]         Nurse:  Date of Birth/Age: 1945 / 79 years  Sonographer:          Tyrone Jaime RDCS  Gender:            M                    Additional Staff:  Height:            162.56 cm            Admit Date:  Weight:            98.88 kg             Admission Status:     Outpatient  BSA / BMI:         2.03 m2 / 37.42      Encounter#:           [id number]  kg/m2  Blood Pressure:    /                    Department Location:  [facility] Non  In[facility]    Study Type:    TRANSTHORACIC ECHO (TTE) COMPLETE  Diagnosis/ICD: Essential (primary) hypertension-I10  Indication:    HTN  CPT Code:      Echo Complete w Full [id number]    Patient History:  Pertinent History: HTN and Hyperlipidemia.    Study Detail: The following Echo studies were performed: 2D, M-Mode, " Doppler and  color flow. Technically challenging study due to body habitus.      PHYSICIAN INTERPRETATION:  Left Ventricle: Left ventricular ejection fraction is normal, by visual estimate at 60-65%. There are no regional left ventricular wall motion abnormalities. The left ventricular cavity size is normal. The left ventricular septal wall thickness is mildly increased. Spectral Doppler shows an impaired relaxation pattern of left ventricular diastolic filling.  Left Atrium: The left atrium is normal in size.  Right Ventricle: The right ventricle is normal in size. There is normal right ventricular global systolic function.  Right Atrium: The right atrium is normal in size.  Aortic Valve: The aortic valve is trileaflet. There is mild aortic valve cusp calcification. The aortic valve dimensionless index is 0.81. There is trace aortic valve regurgitation. The peak instantaneous gradient of the aortic valve is 9.5 mmHg. The mean gradient of the aortic valve is 4.4 mmHg.  Mitral Valve: The mitral valve is normal in structure. There is trace mitral valve regurgitation.  Tricuspid Valve: The tricuspid valve is structurally normal. There is trace tricuspid regurgitation.  Pulmonic Valve: The pulmonic valve is structurally normal. There is trace pulmonic valve regurgitation.  Pericardium: There is no pericardial effusion noted.  Aorta: The aortic root is abnormal. There is mild dilatation of the aortic root.  In comparison to the previous echocardiogram(s): There are no prior studies on this patient for comparison purposes.      CONCLUSIONS:  1. Left ventricular ejection fraction is normal, by visual estimate at 60-65%.  2. Spectral Doppler shows an impaired relaxation pattern of left ventricular diastolic filling.  3. There is normal right ventricular global systolic function.    QUANTITATIVE DATA SUMMARY:    2D MEASUREMENTS:          Normal Ranges:  IVSd:            1.30 cm  (0.6-1.1cm)  LVPWd:           1.08 cm   (0.6-1.1cm)  LVIDd:           5.12 cm  (3.9-5.9cm)  LVIDs:           3.75 cm  LV Mass Index:   118 g/m2  LVEDV Index:     63 ml/m2  LV % FS          26.7 %      LA VOLUME:                    Normal Ranges:  LA Vol A4C:        48.7 ml    (22+/-6mL/m2)  LA Vol A2C:        79.8 ml  LA Vol BP:         62.8 ml  LA Vol Index A4C:  24.0ml/m2  LA Vol Index A2C:  39.4 ml/m2  LA Vol Index BP:   31.0 ml/m2  LA Area A4C:       19.3 cm2  LA Area A2C:       24.9 cm2  LA Major Axis A4C: 6.5 cm  LA Major Axis A2C: 6.6 cm  LA Volume Index:   31.0 ml/m2  LA Vol A4C:        46.6 ml  LA Vol A2C:        72.0 ml  LA Vol Index BSA:  29.2 ml/m2      RA VOLUME BY A/L METHOD:          Normal Ranges:  RA Area A4C:             20.6 cm2      AORTA MEASUREMENTS:         Normal Ranges:  Ao Sinus, d:        3.90 cm (2.1-3.5cm)  Asc Ao, d:          3.80 cm (2.1-3.4cm)      LV SYSTOLIC FUNCTION BY 2D PLANIMETRY (MOD):  Normal Ranges:  EF-A4C View:    64 % (>=55%)  EF-A2C View:    72 %  EF-Biplane:     68 %  EF-Visual:      63 %  LV EF Reported: 63 %      LV DIASTOLIC FUNCTION:             Normal Ranges:  MV Peak E:             0.71 m/s    (0.7-1.2 m/s)  MV Peak A:             1.12 m/s    (0.42-0.7 m/s)  E/A Ratio:             0.63        (1.0-2.2)  MV e'                  0.060 m/s   (>8.0)  MV lateral e'          0.06 m/s  MV medial e'           0.06 m/s  E/e' Ratio:            11.88       (<8.0)  PulmV Sys Pablo:         70.04 cm/s  PulmV Steen Pablo:        46.96 cm/s  PulmV S/D Pablo:         1.49  PulmV A Revs Pablo:      42.85 cm/s  PulmV A Revs Dur:      122.26 msec      MITRAL VALVE:          Normal Ranges:  MV DT:        410 msec (150-240msec)      AORTIC VALVE:                     Normal Ranges:  AoV Vmax:                1.54 m/s (<=1.7m/s)  AoV Peak P.5 mmHg (<20mmHg)  AoV Mean P.4 mmHg (1.7-11.5mmHg)  LVOT Max Pablo:            1.27 m/s (<=1.1m/s)  AoV VTI:                 28.93 cm (18-25cm)  LVOT VTI:                 23.52 cm  LVOT Diameter:           2.30 cm  (1.8-2.4cm)  AoV Area, VTI:           3.38 cm2 (2.5-5.5cm2)  AoV Area,Vmax:           3.44 cm2 (2.5-4.5cm2)  AoV Dimensionless Index: 0.81      RIGHT VENTRICLE:  RV Basal 4.10 cm  RV s'    0.18 m/s      TRICUSPID VALVE/RVSP:          Normal Ranges:  Peak TR Velocity:     2.85 m/s  RV Syst Pressure:     35 mmHg  (< 30mmHg)  IVC Diam:             1.31 cm      PULMONIC VALVE:          Normal Ranges:  PV Accel Time:  46 msec  (>120ms)  PV Max Pablo:     1.0 m/s  (0.6-0.9m/s)  PV Max PG:      3.7 mmHg      Pulmonary Veins:  PulmV A Revs Dur: 122.26 msec  PulmV A Revs Pablo: 42.85 cm/s  PulmV Steen Pablo:   46.96 cm/s  PulmV S/D Pablo:    1.49  PulmV Sys Pablo:    70.04 cm/s      [id number] Mehul Patel MD  Electronically signed on 9/20/2024 at 2:38:01 PM        ** Final **       Cath:      Stress Test:  Stress Results:  Exercise Stress Test With Myocardial Perfusion Spect (Multi Study) 07/30/2024    Interpretation Summary  Interpreted By:  Valencia Dennis and Mercado Amiel  STUDY:  NUCLEAR STRESS TEST;  7/30/2024 11:45 am    INDICATION:  Signs/Symptoms:Elevated calcium score.  71-year-old female with  history of hypertension and hyperlipidemia who was recently found to  have elevated coronary artery calcium score greater 1000.    COMPARISON:  None.    ACCESSION NUMBER(S):  [id number]    ORDERING CLINICIAN:  CHELSEA CHACON    TECHNIQUE:  DIVISION OF NUCLEAR MEDICINE  STRESS MYOCARDIAL PERFUSION SCAN, ONE DAY PROTOCOL    The patient received an intravenous injection of 10.9 mCi of Tc-99m  Myoview and resting emission tomographic (SPECT) images of the  myocardium were acquired. The patient then underwent treadmill stress  exercising to 106 % of MPHR and achieving 10.5 METS.  At peak stress  an additional injection of  31.7 mCi of Tc-99m Myoview was  administered and stress phase SPECT images of the myocardium were  then acquired. This acquisition included ECG-gated images to assess  and quantify  ventricular function. Low dose CT was acquired for  attenuation correction.    FINDINGS:  Both stress and rest studies demonstrate normal perfusion throughout  the left ventricle.    The left ventricle is normal in size.    Gated images demonstrate normal LV wall motion with a post-stress LV  EF estimated at 59%.    Attenuation correction CT images demonstrate no gross anatomic  abnormalities.    Impression  1. Negative myocardial perfusion study without evidence of inducible  myocardial ischemia or prior infarction.  2. The left ventricle is normal in size.  3. Normal LV wall motion with a post-stress LV EF estimated at 59%.    I personally reviewed the image(s)/study and resident interpretation  as stated by Sylvester Coyne MD (Resident Physician, PGY-2). I agree  with the findings as stated. This study was interpreted at [facility], [place], [place].    MACRO:  None    Signed by: Valencia Dennis 7/30/2024 5:36 PM  Dictation workstation:   [id number]         Cardiac Imaging:  CT cardiac scoring June 27, 2024  FINDINGS:  The score and distribution of calcium in the coronary arteries is as  follows:      LM 30.06, 1  .17, 11  LCx 11.21, 2  .57, 16      Total 1121.01      The visualized ascending thoracic aorta is dilated measuring 4 cm in  diameter. The heart is normal in size. No pericardial effusion is  present.    CT abdomen and pelvis May 14, 2019  There are scattered atherosclerotic calcifications of the abdominal aorta with no aneurysmal dilatation.       Assessment/Plan   Diagnoses and all orders for this visit:  Coronary artery disease involving native coronary artery of native heart without angina pectoris  Hypertension, unspecified type  -     olmesartan (BENIcar) 40 mg tablet; Take 1 tablet (40 mg) by mouth once daily.  -     doxazosin (Cardura) 2 mg tablet; Take 1 tablet (2 mg) by mouth once daily at bedtime.  -     Comprehensive metabolic panel;  Future  Hyperlipidemia, unspecified hyperlipidemia type  Ascending aorta dilatation (CMS-HCC)  -     CT chest wo IV contrast; Future  Suspected sleep apnea  -     Referral to Adult Sleep Medicine; Future    In summary Mr. Saxena is a pleasant 80-year-old male with a past medical history significant for coronary artery disease with a CT calcium score of 1121 in 2024 with preserved LV function, hypertension, hyperlipidemia with statin intolerance, and mildly dilated ascending aorta.  He is asymptomatic from a cardiac perspective.  His blood pressure is elevated today.  His home readings are better.  He is intolerant to eplerenone and I discontinued it.  I switched his valsartan to olmesartan 40 mg daily.  I also added doxazosin 2 mg daily.  He will continue to monitor his blood pressure at home.  We will check a CMP in 2 weeks.  I also placed a sleep medicine consult to rule out sleep apnea.  His lipids are acceptable.  He should continue his other cardiovascular medications.  I ordered a noncontrast CT of the chest for surveillance of his aorta in June.  We will see him back in follow-up in 6 months.    Orders:  Orders Placed This Encounter   Procedures    CT chest wo IV contrast    Comprehensive metabolic panel    Referral to Adult Sleep Medicine      Followup Appts:  Future Appointments   Date Time Provider Department Center   6/5/2025  1:00 [facility]   9/18/2025  2:00 PM [id number]             ____________________________________________________________  Mehul Ptael MD  [facility] [place]  [facility]

## 2025-05-30 LAB
ALBUMIN SERPL-MCNC: 4.7 G/DL (ref 3.6–5.1)
ALP SERPL-CCNC: 47 U/L (ref 35–144)
ALT SERPL-CCNC: 13 U/L (ref 9–46)
ANION GAP SERPL CALCULATED.4IONS-SCNC: 9 MMOL/L (CALC) (ref 7–17)
AST SERPL-CCNC: 14 U/L (ref 10–35)
BILIRUB SERPL-MCNC: 0.9 MG/DL (ref 0.2–1.2)
BUN SERPL-MCNC: 16 MG/DL (ref 7–25)
CALCIUM SERPL-MCNC: 9.3 MG/DL (ref 8.6–10.3)
CHLORIDE SERPL-SCNC: 106 MMOL/L (ref 98–110)
CO2 SERPL-SCNC: 28 MMOL/L (ref 20–32)
CREAT SERPL-MCNC: 1.13 MG/DL (ref 0.7–1.28)
EGFRCR SERPLBLD CKD-EPI 2021: 66 ML/MIN/1.73M2
GLUCOSE SERPL-MCNC: 121 MG/DL (ref 65–99)
POTASSIUM SERPL-SCNC: 4.3 MMOL/L (ref 3.5–5.3)
PROT SERPL-MCNC: 6.9 G/DL (ref 6.1–8.1)
SODIUM SERPL-SCNC: 143 MMOL/L (ref 135–146)

## 2025-06-05 ENCOUNTER — HOSPITAL ENCOUNTER (OUTPATIENT)
Dept: RADIOLOGY | Facility: HOSPITAL | Age: 80
Discharge: HOME | End: 2025-06-05
Payer: MEDICARE

## 2025-06-05 DIAGNOSIS — I77.810 ASCENDING AORTA DILATATION: ICD-10-CM

## 2025-06-05 PROCEDURE — 71250 CT THORAX DX C-: CPT

## 2025-06-05 PROCEDURE — 71250 CT THORAX DX C-: CPT | Performed by: RADIOLOGY

## 2025-06-06 NOTE — RESULT ENCOUNTER NOTE
Stable mildly dilated ascending aorta.  Mild abnormality in the lung fields and incidental renal cysts.  Follow-up with primary physician.  Recheck noncontrast CT of the chest in 1 year for surveillance of the aorta.

## 2025-06-13 ENCOUNTER — TELEPHONE (OUTPATIENT)
Dept: CARDIOLOGY | Facility: HOSPITAL | Age: 80
End: 2025-06-13
Payer: MEDICARE

## 2025-06-13 DIAGNOSIS — I10 HYPERTENSION, UNSPECIFIED TYPE: Primary | ICD-10-CM

## 2025-06-13 NOTE — TELEPHONE ENCOUNTER
Per MOON Mohamud, I provided the patient with the following instructions:  -discontinue Olmesartan  -start Losartan 100 mg daily  -recheck BMP in 1 week  -have sleep study done  Patient was agreeable and verbalized understanding. He states he has scheduled a Sleep Study, September was first available.

## 2025-06-13 NOTE — TELEPHONE ENCOUNTER
Patient states that he has been experiencing fatigue since he switched to the Olmesartan in March and low heart rates, 40 - 50s for the last month. His b/p averages 120/60s. He denies chest pain, light headedness, dizziness, palpitations,and syncope.  Per MOON Mohamud,:  -discontinue Olmesartan  -start Losartan 100 mg daily  -recheck BMP in 1 week  -have sleep study done

## 2025-06-13 NOTE — TELEPHONE ENCOUNTER
Per Dr. Patel, I provided the patient with the following results:  -Stable mildly dilated ascending aorta.  -Mild abnormality in the lung fields   -incidental renal cysts.  -Follow-up with primary physician.   -Recheck noncontrast CT of the chest in 1 year for surveillance of the aorta.   Patient was agreeable and verbalized understanding.

## 2025-06-16 RX ORDER — LOSARTAN POTASSIUM 100 MG/1
100 TABLET ORAL DAILY
Qty: 90 TABLET | Refills: 3 | Status: SHIPPED | OUTPATIENT
Start: 2025-06-16 | End: 2026-06-16

## 2025-06-27 LAB
ANION GAP SERPL CALCULATED.4IONS-SCNC: 9 MMOL/L (CALC) (ref 7–17)
BUN SERPL-MCNC: 20 MG/DL (ref 7–25)
BUN/CREAT SERPL: ABNORMAL (CALC) (ref 6–22)
CALCIUM SERPL-MCNC: 9.1 MG/DL (ref 8.6–10.3)
CHLORIDE SERPL-SCNC: 108 MMOL/L (ref 98–110)
CO2 SERPL-SCNC: 26 MMOL/L (ref 20–32)
CREAT SERPL-MCNC: 1.05 MG/DL (ref 0.7–1.28)
EGFRCR SERPLBLD CKD-EPI 2021: 72 ML/MIN/1.73M2
GLUCOSE SERPL-MCNC: 114 MG/DL (ref 65–99)
POTASSIUM SERPL-SCNC: 4.4 MMOL/L (ref 3.5–5.3)
SODIUM SERPL-SCNC: 143 MMOL/L (ref 135–146)

## 2025-07-10 ENCOUNTER — PATIENT MESSAGE (OUTPATIENT)
Dept: CARDIOLOGY | Facility: HOSPITAL | Age: 80
End: 2025-07-10
Payer: MEDICARE

## 2025-07-10 DIAGNOSIS — E78.5 HYPERLIPIDEMIA, UNSPECIFIED HYPERLIPIDEMIA TYPE: ICD-10-CM

## 2025-07-10 DIAGNOSIS — I25.10 CORONARY ARTERY DISEASE INVOLVING NATIVE CORONARY ARTERY OF NATIVE HEART WITHOUT ANGINA PECTORIS: ICD-10-CM

## 2025-07-11 RX ORDER — ROSUVASTATIN CALCIUM 20 MG/1
20 TABLET, COATED ORAL DAILY
Qty: 90 TABLET | Refills: 3 | Status: SHIPPED | OUTPATIENT
Start: 2025-07-11 | End: 2026-07-11

## 2025-09-18 ENCOUNTER — APPOINTMENT (OUTPATIENT)
Dept: SLEEP MEDICINE | Facility: CLINIC | Age: 80
End: 2025-09-18
Payer: MEDICARE